# Patient Record
Sex: MALE | Race: OTHER | Employment: FULL TIME | URBAN - METROPOLITAN AREA
[De-identification: names, ages, dates, MRNs, and addresses within clinical notes are randomized per-mention and may not be internally consistent; named-entity substitution may affect disease eponyms.]

---

## 2019-08-01 ENCOUNTER — HOSPITAL ENCOUNTER (OUTPATIENT)
Dept: ULTRASOUND IMAGING | Age: 54
Discharge: HOME | End: 2019-08-01
Attending: INTERNAL MEDICINE
Payer: COMMERCIAL

## 2019-08-01 DIAGNOSIS — B18.2 CHRONIC HEPATITIS C WITHOUT HEPATIC COMA (HCC): ICD-10-CM

## 2019-08-01 PROCEDURE — 76705 ECHO EXAM OF ABDOMEN: CPT

## 2022-08-24 ENCOUNTER — HOSPITAL ENCOUNTER (EMERGENCY)
Age: 57
Discharge: HOME | End: 2022-08-24
Attending: EMERGENCY MEDICINE

## 2022-08-24 VITALS
HEART RATE: 101 BPM | TEMPERATURE: 98.2 F | HEIGHT: 66 IN | RESPIRATION RATE: 18 BRPM | SYSTOLIC BLOOD PRESSURE: 148 MMHG | BODY MASS INDEX: 29.73 KG/M2 | DIASTOLIC BLOOD PRESSURE: 102 MMHG | WEIGHT: 185 LBS | OXYGEN SATURATION: 98 %

## 2022-08-24 DIAGNOSIS — F14.90 COCAINE USE: Primary | ICD-10-CM

## 2022-08-24 DIAGNOSIS — L50.9 URTICARIA: ICD-10-CM

## 2022-08-24 PROCEDURE — 74011250636 HC RX REV CODE- 250/636: Performed by: EMERGENCY MEDICINE

## 2022-08-24 PROCEDURE — 74011636637 HC RX REV CODE- 636/637: Performed by: EMERGENCY MEDICINE

## 2022-08-24 PROCEDURE — 99284 EMERGENCY DEPT VISIT MOD MDM: CPT

## 2022-08-24 PROCEDURE — 96372 THER/PROPH/DIAG INJ SC/IM: CPT

## 2022-08-24 RX ORDER — PREDNISONE 20 MG/1
60 TABLET ORAL DAILY
Qty: 15 TABLET | Refills: 0 | Status: SHIPPED | OUTPATIENT
Start: 2022-08-24 | End: 2022-08-29

## 2022-08-24 RX ORDER — HYDROXYZINE 50 MG/1
50 TABLET, FILM COATED ORAL
Qty: 20 TABLET | Refills: 0 | Status: SHIPPED | OUTPATIENT
Start: 2022-08-24 | End: 2022-09-03

## 2022-08-24 RX ORDER — HYDROXYZINE HYDROCHLORIDE 25 MG/ML
50 INJECTION, SOLUTION INTRAMUSCULAR
Status: COMPLETED | OUTPATIENT
Start: 2022-08-24 | End: 2022-08-24

## 2022-08-24 RX ORDER — PREDNISONE 20 MG/1
60 TABLET ORAL
Status: COMPLETED | OUTPATIENT
Start: 2022-08-24 | End: 2022-08-24

## 2022-08-24 RX ADMIN — HYDROXYZINE HYDROCHLORIDE 50 MG: 25 INJECTION, SOLUTION INTRAMUSCULAR at 21:29

## 2022-08-24 RX ADMIN — PREDNISONE 60 MG: 20 TABLET ORAL at 21:14

## 2022-08-25 NOTE — ED TRIAGE NOTES
Patient to ED via EMS for \"bugs on him\" patient reports noticed this about 2hrs PTA. Patient also reports cocaine use about 2hr PTA. No visible bugs in triage. Patient stating a friend was reporting the same. Patient has scratch marks to bilateral arms, skin avulsion to R elbow area.

## 2022-08-25 NOTE — ED NOTES
Emergency Department Nursing Plan of Care       The Nursing Plan of Care is developed from the Nursing assessment and Emergency Department Attending provider initial evaluation. The plan of care may be reviewed in the ED Provider note.     The Plan of Care was developed with the following considerations:   Patient / Family readiness to learn indicated by:verbalized understanding  Persons(s) to be included in education: patient  Barriers to Learning/Limitations:No    Signed     Lio Everett, BIPIN    8/24/2022   10:15 PM

## 2022-08-25 NOTE — ED NOTES
Discharge instructions were given to the patient by MD.     The patient left the Emergency Department ambulatory, alert and oriented and in no acute distress with 2 prescriptions. The patient was encouraged to call or return to the ED for worsening issues or problems and was encouraged to schedule a follow up appointment for continuing care. The patient verbalized understanding of discharge instructions and prescriptions, all questions were answered. The patient has no further concerns at this time.

## 2022-08-25 NOTE — ED PROVIDER NOTES
78-year-old male with a history of depression and mood disorder presents via EMS with chief complaint of \"bug bites. \"  Explains that he used cocaine several hours ago. He was then at a friend's house. States after 5 minutes in the house he started having \"bumps\" all over his arms and the skin exposed areas distal to his T-shirt sleeve. Patient has been scratching at them to the point that there are small bleeding wounds. Past Medical History:   Diagnosis Date    Depression     Mood disorder ([address]     Sleep disorder     Suicidal thoughts        No past surgical history on file. No family history on file. Social History     Socioeconomic History    Marital status:      Spouse name: Not on file    Number of children: Not on file    Years of education: Not on file    Highest education level: Not on file   Occupational History    Not on file   Tobacco Use    Smoking status: Every Day     Packs/day: 0.25     Types: Cigarettes    Smokeless tobacco: Not on file   Substance and Sexual Activity    Alcohol use: No     Comment: I only drank the other night    Drug use: No    Sexual activity: Yes     Partners: Female   Other Topics Concern    Not on file   Social History Narrative    ** Merged History Encounter **          Social Determinants of Health     Financial Resource Strain: Not on file   Food Insecurity: Not on file   Transportation Needs: Not on file   Physical Activity: Not on file   Stress: Not on file   Social Connections: Not on file   Intimate Partner Violence: Not on file   Housing Stability: Not on file         ALLERGIES: Ibuprofen    Review of Systems    Vitals:    08/24/22 2056   BP: (!) 170/81   Pulse: (!) 130   Resp: 18   Temp: 98.2 °F (36.8 °C)   SpO2: 96%   Weight: 83.9 kg (185 lb)   Height: 5' 6\" (1.676 m)            Physical Exam  Skin:     Comments: There are erythematous patches scattered to both arms.   On the posterior left upper arm there is an area that has central wheals, suggestive of urticaria. MDM  Number of Diagnoses or Management Options  Diagnosis management comments: Area, dermatitis, delusions of infestation related to cocaine. Vital sign abnormalities consistent with cocaine use.   Will give Vistaril to combat pruritus and repeat vs             Procedures